# Patient Record
Sex: FEMALE
[De-identification: names, ages, dates, MRNs, and addresses within clinical notes are randomized per-mention and may not be internally consistent; named-entity substitution may affect disease eponyms.]

---

## 2018-03-27 ENCOUNTER — HOSPITAL ENCOUNTER (INPATIENT)
Dept: HOSPITAL 88 - ER | Age: 78
LOS: 5 days | Discharge: HOME | DRG: 690 | End: 2018-04-01
Attending: INTERNAL MEDICINE | Admitting: INTERNAL MEDICINE
Payer: MEDICARE

## 2018-03-27 VITALS — WEIGHT: 265.12 LBS | BODY MASS INDEX: 41.61 KG/M2 | HEIGHT: 67 IN

## 2018-03-27 DIAGNOSIS — N39.0: Primary | ICD-10-CM

## 2018-03-27 DIAGNOSIS — B96.1: ICD-10-CM

## 2018-03-27 DIAGNOSIS — I10: ICD-10-CM

## 2018-03-27 DIAGNOSIS — E66.01: ICD-10-CM

## 2018-03-27 DIAGNOSIS — Z16.12: ICD-10-CM

## 2018-03-27 LAB
ALBUMIN SERPL-MCNC: 4 G/DL (ref 3.5–5)
ALBUMIN/GLOB SERPL: 1.3 {RATIO} (ref 0.8–2)
ALP SERPL-CCNC: 93 IU/L (ref 40–150)
ALT SERPL-CCNC: 24 IU/L (ref 0–55)
ANION GAP SERPL CALC-SCNC: 13.8 MMOL/L (ref 8–16)
BACTERIA URNS QL MICRO: (no result) /HPF
BASOPHILS # BLD AUTO: 0 10*3/UL (ref 0–0.1)
BASOPHILS NFR BLD AUTO: 0 % (ref 0–1)
BILIRUB UR QL: NEGATIVE
BUN SERPL-MCNC: 18 MG/DL (ref 7–26)
BUN/CREAT SERPL: 19 (ref 6–25)
CALCIUM SERPL-MCNC: 10 MG/DL (ref 8.4–10.2)
CHLORIDE SERPL-SCNC: 104 MMOL/L (ref 98–107)
CLARITY UR: CLEAR
CO2 SERPL-SCNC: 25 MMOL/L (ref 22–29)
COLOR UR: YELLOW
DEPRECATED NEUTROPHILS # BLD AUTO: 3.7 10*3/UL (ref 2.1–6.9)
DEPRECATED RBC URNS MANUAL-ACNC: (no result) /HPF (ref 0–5)
EGFRCR SERPLBLD CKD-EPI 2021: 57 ML/MIN (ref 60–?)
EOSINOPHIL # BLD AUTO: 0 10*3/UL (ref 0–0.4)
EOSINOPHIL NFR BLD AUTO: 0 % (ref 0–6)
EPI CELLS URNS QL MICRO: (no result) /LPF
ERYTHROCYTE [DISTWIDTH] IN CORD BLOOD: 15.2 % (ref 11.7–14.4)
GLOBULIN PLAS-MCNC: 3 G/DL (ref 2.3–3.5)
GLUCOSE SERPLBLD-MCNC: 101 MG/DL (ref 74–118)
HCT VFR BLD AUTO: 33 % (ref 34.2–44.1)
HGB BLD-MCNC: 11.3 G/DL (ref 12–16)
KETONES UR QL STRIP.AUTO: NEGATIVE
LEUKOCYTE ESTERASE UR QL STRIP.AUTO: (no result)
LYMPHOCYTES # BLD: 1.5 10*3/UL (ref 1–3.2)
LYMPHOCYTES NFR BLD AUTO: 26 % (ref 18–39.1)
MCH RBC QN AUTO: 29.5 PG (ref 28–32)
MCHC RBC AUTO-ENTMCNC: 34.2 G/DL (ref 31–35)
MCV RBC AUTO: 86.2 FL (ref 81–99)
MONOCYTES # BLD AUTO: 0.4 10*3/UL (ref 0.2–0.8)
MONOCYTES NFR BLD AUTO: 7.4 % (ref 4.4–11.3)
NEUTS SEG NFR BLD AUTO: 66.2 % (ref 38.7–80)
NITRITE UR QL STRIP.AUTO: NEGATIVE
PLATELET # BLD AUTO: 159 X10E3/UL (ref 140–360)
POTASSIUM SERPL-SCNC: 3.8 MMOL/L (ref 3.5–5.1)
PROT UR QL STRIP.AUTO: NEGATIVE
RBC # BLD AUTO: 3.83 X10E6/UL (ref 3.6–5.1)
SODIUM SERPL-SCNC: 139 MMOL/L (ref 136–145)
SP GR UR STRIP: 1 (ref 1.01–1.02)
UROBILINOGEN UR STRIP-MCNC: 0.2 MG/DL (ref 0.2–1)
WBC #/AREA URNS HPF: (no result) /HPF (ref 0–5)

## 2018-03-27 PROCEDURE — 87086 URINE CULTURE/COLONY COUNT: CPT

## 2018-03-27 PROCEDURE — 85025 COMPLETE CBC W/AUTO DIFF WBC: CPT

## 2018-03-27 PROCEDURE — 83036 HEMOGLOBIN GLYCOSYLATED A1C: CPT

## 2018-03-27 PROCEDURE — 87071 CULTURE AEROBIC QUANT OTHER: CPT

## 2018-03-27 PROCEDURE — 80053 COMPREHEN METABOLIC PANEL: CPT

## 2018-03-27 PROCEDURE — 36415 COLL VENOUS BLD VENIPUNCTURE: CPT

## 2018-03-27 PROCEDURE — 96365 THER/PROPH/DIAG IV INF INIT: CPT

## 2018-03-27 PROCEDURE — 81001 URINALYSIS AUTO W/SCOPE: CPT

## 2018-03-27 PROCEDURE — 82948 REAGENT STRIP/BLOOD GLUCOSE: CPT

## 2018-03-27 PROCEDURE — 74177 CT ABD & PELVIS W/CONTRAST: CPT

## 2018-03-27 PROCEDURE — 96374 THER/PROPH/DIAG INJ IV PUSH: CPT

## 2018-03-27 PROCEDURE — 87205 SMEAR GRAM STAIN: CPT

## 2018-03-27 PROCEDURE — 99284 EMERGENCY DEPT VISIT MOD MDM: CPT

## 2018-03-27 PROCEDURE — 80048 BASIC METABOLIC PNL TOTAL CA: CPT

## 2018-03-27 PROCEDURE — 82607 VITAMIN B-12: CPT

## 2018-03-27 PROCEDURE — 87040 BLOOD CULTURE FOR BACTERIA: CPT

## 2018-03-27 PROCEDURE — 84443 ASSAY THYROID STIM HORMONE: CPT

## 2018-03-27 RX ADMIN — HYDRALAZINE HYDROCHLORIDE PRN MG: 20 INJECTION INTRAMUSCULAR; INTRAVENOUS at 23:13

## 2018-03-27 RX ADMIN — Medication PRN ML: at 23:11

## 2018-03-28 VITALS — SYSTOLIC BLOOD PRESSURE: 148 MMHG | DIASTOLIC BLOOD PRESSURE: 69 MMHG

## 2018-03-28 VITALS — SYSTOLIC BLOOD PRESSURE: 127 MMHG | DIASTOLIC BLOOD PRESSURE: 59 MMHG

## 2018-03-28 VITALS — DIASTOLIC BLOOD PRESSURE: 59 MMHG | SYSTOLIC BLOOD PRESSURE: 127 MMHG

## 2018-03-28 VITALS — DIASTOLIC BLOOD PRESSURE: 69 MMHG | SYSTOLIC BLOOD PRESSURE: 148 MMHG

## 2018-03-28 LAB
ALBUMIN SERPL-MCNC: 3.7 G/DL (ref 3.5–5)
ALBUMIN/GLOB SERPL: 1.3 {RATIO} (ref 0.8–2)
ALP SERPL-CCNC: 81 IU/L (ref 40–150)
ALT SERPL-CCNC: 21 IU/L (ref 0–55)
ANION GAP SERPL CALC-SCNC: 11.5 MMOL/L (ref 8–16)
BASOPHILS # BLD AUTO: 0 10*3/UL (ref 0–0.1)
BASOPHILS NFR BLD AUTO: 0 % (ref 0–1)
BUN SERPL-MCNC: 16 MG/DL (ref 7–26)
BUN/CREAT SERPL: 21 (ref 6–25)
CALCIUM SERPL-MCNC: 9.9 MG/DL (ref 8.4–10.2)
CHLORIDE SERPL-SCNC: 105 MMOL/L (ref 98–107)
CO2 SERPL-SCNC: 27 MMOL/L (ref 22–29)
DEPRECATED NEUTROPHILS # BLD AUTO: 2.7 10*3/UL (ref 2.1–6.9)
EGFRCR SERPLBLD CKD-EPI 2021: > 60 ML/MIN (ref 60–?)
EOSINOPHIL # BLD AUTO: 0 10*3/UL (ref 0–0.4)
EOSINOPHIL NFR BLD AUTO: 0 % (ref 0–6)
ERYTHROCYTE [DISTWIDTH] IN CORD BLOOD: 15.5 % (ref 11.7–14.4)
GLOBULIN PLAS-MCNC: 2.8 G/DL (ref 2.3–3.5)
GLUCOSE SERPLBLD-MCNC: 93 MG/DL (ref 74–118)
HCT VFR BLD AUTO: 32.6 % (ref 34.2–44.1)
HGB BLD-MCNC: 10.8 G/DL (ref 12–16)
LYMPHOCYTES # BLD: 1.9 10*3/UL (ref 1–3.2)
LYMPHOCYTES NFR BLD AUTO: 37.1 % (ref 18–39.1)
MCH RBC QN AUTO: 29.2 PG (ref 28–32)
MCHC RBC AUTO-ENTMCNC: 33.1 G/DL (ref 31–35)
MCV RBC AUTO: 88.1 FL (ref 81–99)
MONOCYTES # BLD AUTO: 0.5 10*3/UL (ref 0.2–0.8)
MONOCYTES NFR BLD AUTO: 9.5 % (ref 4.4–11.3)
NEUTS SEG NFR BLD AUTO: 53.2 % (ref 38.7–80)
PLATELET # BLD AUTO: 148 X10E3/UL (ref 140–360)
POTASSIUM SERPL-SCNC: 3.5 MMOL/L (ref 3.5–5.1)
RBC # BLD AUTO: 3.7 X10E6/UL (ref 3.6–5.1)
SODIUM SERPL-SCNC: 140 MMOL/L (ref 136–145)

## 2018-03-28 RX ADMIN — MEROPENEM SCH MLS/HR: 500 INJECTION INTRAVENOUS at 06:58

## 2018-03-28 RX ADMIN — HYDRALAZINE HYDROCHLORIDE PRN MG: 20 INJECTION INTRAMUSCULAR; INTRAVENOUS at 16:10

## 2018-03-28 RX ADMIN — Medication PRN ML: at 16:10

## 2018-03-28 RX ADMIN — MEROPENEM SCH MLS/HR: 500 INJECTION INTRAVENOUS at 15:45

## 2018-03-28 NOTE — DIAGNOSTIC IMAGING REPORT
EXAM: CT ABDOMEN/PELVIS W

DATE: 3/28/2018 8:47 PM  

INDICATION:  \S\COMPLICATED UTI / MDR INFECTION

COMPARISON:  None 

TECHNIQUE: The abdomen and pelvis were scanned using a multidetector helical

scanner. Coronal and sagittal reformations were obtained. Routine protocol

performed.

IV Contrast: 100 ml Isovue 370



FINDINGS:

LOWER THORAX: Minimal scattered atelectasis and air trapping. Nonspecific 2 mm

right pulmonary nodules.



LIVER/BILIARY:  No masses.  No ductal dilatation.



GALLBLADDER: Unremarkable

SPLEEN: Unremarkable

PANCREAS: Unremarkable



ADRENALS: No nodules

KIDNEYS: Symmetric perfusion. No enhancing masses. No hydronephrosis.



GI TRACT: No wall thickening or evidence of obstruction. Normal appendix.



VESSELS: Moderate atherosclerotic changes.

PERITONEUM/RETROPERITONEUM: No free air or fluid

LYMPH NODES: No lymphadenopathy



REPRODUCTIVE ORGANS/BLADDER: Incidental calcified fibroid. Bladder appears

mildly thick walled but is decompressed precluding optimal evaluation.



SOFT TISSUES: Rectus muscle diastases. Tiny umbilical fat-containing hernia.

BONES: Multilevel degenerative changes which results in canal stenosis for

example at L4-5.



IMPRESSION:

No acute abnormality.



Signed by: Dr Jaky Chavez MD on 3/28/2018 10:51 PM

## 2018-03-29 VITALS — SYSTOLIC BLOOD PRESSURE: 94 MMHG | DIASTOLIC BLOOD PRESSURE: 55 MMHG

## 2018-03-29 VITALS — DIASTOLIC BLOOD PRESSURE: 51 MMHG | SYSTOLIC BLOOD PRESSURE: 97 MMHG

## 2018-03-29 VITALS — DIASTOLIC BLOOD PRESSURE: 74 MMHG | SYSTOLIC BLOOD PRESSURE: 180 MMHG

## 2018-03-29 VITALS — DIASTOLIC BLOOD PRESSURE: 59 MMHG | SYSTOLIC BLOOD PRESSURE: 128 MMHG

## 2018-03-29 VITALS — SYSTOLIC BLOOD PRESSURE: 180 MMHG | DIASTOLIC BLOOD PRESSURE: 74 MMHG

## 2018-03-29 VITALS — DIASTOLIC BLOOD PRESSURE: 60 MMHG | SYSTOLIC BLOOD PRESSURE: 124 MMHG

## 2018-03-29 LAB
ANION GAP SERPL CALC-SCNC: 9.4 MMOL/L (ref 8–16)
BASOPHILS # BLD AUTO: 0 10*3/UL (ref 0–0.1)
BASOPHILS NFR BLD AUTO: 0 % (ref 0–1)
BUN SERPL-MCNC: 16 MG/DL (ref 7–26)
BUN/CREAT SERPL: 21 (ref 6–25)
CALCIUM SERPL-MCNC: 9 MG/DL (ref 8.4–10.2)
CHLORIDE SERPL-SCNC: 105 MMOL/L (ref 98–107)
CO2 SERPL-SCNC: 25 MMOL/L (ref 22–29)
DEPRECATED NEUTROPHILS # BLD AUTO: 2 10*3/UL (ref 2.1–6.9)
EGFRCR SERPLBLD CKD-EPI 2021: > 60 ML/MIN (ref 60–?)
EOSINOPHIL # BLD AUTO: 0 10*3/UL (ref 0–0.4)
EOSINOPHIL NFR BLD AUTO: 0 % (ref 0–6)
ERYTHROCYTE [DISTWIDTH] IN CORD BLOOD: 15.6 % (ref 11.7–14.4)
GLUCOSE SERPLBLD-MCNC: 85 MG/DL (ref 74–118)
HCT VFR BLD AUTO: 30.3 % (ref 34.2–44.1)
HGB BLD-MCNC: 10.3 G/DL (ref 12–16)
LYMPHOCYTES # BLD: 2.1 10*3/UL (ref 1–3.2)
LYMPHOCYTES NFR BLD AUTO: 46.4 % (ref 18–39.1)
MCH RBC QN AUTO: 29.3 PG (ref 28–32)
MCHC RBC AUTO-ENTMCNC: 34 G/DL (ref 31–35)
MCV RBC AUTO: 86.1 FL (ref 81–99)
MONOCYTES # BLD AUTO: 0.4 10*3/UL (ref 0.2–0.8)
MONOCYTES NFR BLD AUTO: 9.7 % (ref 4.4–11.3)
NEUTS SEG NFR BLD AUTO: 43.7 % (ref 38.7–80)
PLATELET # BLD AUTO: 137 X10E3/UL (ref 140–360)
POTASSIUM SERPL-SCNC: 3.4 MMOL/L (ref 3.5–5.1)
RBC # BLD AUTO: 3.52 X10E6/UL (ref 3.6–5.1)
SODIUM SERPL-SCNC: 136 MMOL/L (ref 136–145)

## 2018-03-29 RX ADMIN — Medication SCH MG: at 05:33

## 2018-03-29 RX ADMIN — Medication SCH DROP: at 11:55

## 2018-03-29 RX ADMIN — BRIMONIDINE TARTRATE SCH DROP: 1 SOLUTION/ DROPS OPHTHALMIC at 11:54

## 2018-03-29 RX ADMIN — DORZOLAMIDE HYDROCHLORIDE SCH ML: 20 SOLUTION/ DROPS OPHTHALMIC at 10:30

## 2018-03-29 RX ADMIN — LISINOPRIL SCH MG: 10 TABLET ORAL at 10:36

## 2018-03-29 RX ADMIN — MEROPENEM SCH MG: 500 INJECTION INTRAVENOUS at 08:06

## 2018-03-29 RX ADMIN — MEROPENEM SCH MG: 500 INJECTION INTRAVENOUS at 16:23

## 2018-03-29 RX ADMIN — HYDRALAZINE HYDROCHLORIDE PRN MG: 20 INJECTION INTRAMUSCULAR; INTRAVENOUS at 07:27

## 2018-03-29 RX ADMIN — METOPROLOL SUCCINATE SCH MG: 25 TABLET, EXTENDED RELEASE ORAL at 09:00

## 2018-03-30 VITALS — SYSTOLIC BLOOD PRESSURE: 162 MMHG | DIASTOLIC BLOOD PRESSURE: 75 MMHG

## 2018-03-30 VITALS — SYSTOLIC BLOOD PRESSURE: 150 MMHG | DIASTOLIC BLOOD PRESSURE: 67 MMHG

## 2018-03-30 VITALS — DIASTOLIC BLOOD PRESSURE: 57 MMHG | SYSTOLIC BLOOD PRESSURE: 121 MMHG

## 2018-03-30 VITALS — SYSTOLIC BLOOD PRESSURE: 124 MMHG | DIASTOLIC BLOOD PRESSURE: 56 MMHG

## 2018-03-30 VITALS — DIASTOLIC BLOOD PRESSURE: 66 MMHG | SYSTOLIC BLOOD PRESSURE: 154 MMHG

## 2018-03-30 VITALS — SYSTOLIC BLOOD PRESSURE: 110 MMHG | DIASTOLIC BLOOD PRESSURE: 54 MMHG

## 2018-03-30 RX ADMIN — Medication SCH MG: at 17:45

## 2018-03-30 RX ADMIN — MEROPENEM SCH MG: 500 INJECTION INTRAVENOUS at 08:00

## 2018-03-30 RX ADMIN — ENOXAPARIN SODIUM SCH MG: 40 INJECTION SUBCUTANEOUS at 17:45

## 2018-03-30 RX ADMIN — Medication SCH MG: at 11:35

## 2018-03-30 RX ADMIN — METOPROLOL SUCCINATE SCH MG: 25 TABLET, EXTENDED RELEASE ORAL at 09:39

## 2018-03-30 RX ADMIN — Medication SCH MG: at 17:00

## 2018-03-30 RX ADMIN — Medication SCH DROP: at 10:33

## 2018-03-30 RX ADMIN — MEROPENEM SCH MG: 500 INJECTION INTRAVENOUS at 00:23

## 2018-03-30 RX ADMIN — LISINOPRIL SCH MG: 10 TABLET ORAL at 09:38

## 2018-03-30 RX ADMIN — Medication SCH MG: at 05:30

## 2018-03-30 RX ADMIN — Medication SCH MG: at 11:41

## 2018-03-30 RX ADMIN — BRIMONIDINE TARTRATE SCH DROP: 1 SOLUTION/ DROPS OPHTHALMIC at 10:33

## 2018-03-30 RX ADMIN — MEROPENEM SCH MG: 500 INJECTION INTRAVENOUS at 16:00

## 2018-03-30 RX ADMIN — DORZOLAMIDE HYDROCHLORIDE SCH ML: 20 SOLUTION/ DROPS OPHTHALMIC at 09:00

## 2018-03-30 RX ADMIN — LISINOPRIL SCH MG: 10 TABLET ORAL at 17:45

## 2018-03-31 VITALS — SYSTOLIC BLOOD PRESSURE: 141 MMHG | DIASTOLIC BLOOD PRESSURE: 66 MMHG

## 2018-03-31 VITALS — DIASTOLIC BLOOD PRESSURE: 71 MMHG | SYSTOLIC BLOOD PRESSURE: 171 MMHG

## 2018-03-31 VITALS — DIASTOLIC BLOOD PRESSURE: 68 MMHG | SYSTOLIC BLOOD PRESSURE: 120 MMHG

## 2018-03-31 VITALS — DIASTOLIC BLOOD PRESSURE: 63 MMHG | SYSTOLIC BLOOD PRESSURE: 150 MMHG

## 2018-03-31 VITALS — SYSTOLIC BLOOD PRESSURE: 148 MMHG | DIASTOLIC BLOOD PRESSURE: 65 MMHG

## 2018-03-31 VITALS — DIASTOLIC BLOOD PRESSURE: 58 MMHG | SYSTOLIC BLOOD PRESSURE: 119 MMHG

## 2018-03-31 LAB
ANION GAP SERPL CALC-SCNC: 8.7 MMOL/L (ref 8–16)
BASOPHILS # BLD AUTO: 0 10*3/UL (ref 0–0.1)
BASOPHILS NFR BLD AUTO: 0 % (ref 0–1)
BUN SERPL-MCNC: 17 MG/DL (ref 7–26)
BUN/CREAT SERPL: 24 (ref 6–25)
CALCIUM SERPL-MCNC: 9.2 MG/DL (ref 8.4–10.2)
CHLORIDE SERPL-SCNC: 101 MMOL/L (ref 98–107)
CO2 SERPL-SCNC: 27 MMOL/L (ref 22–29)
DEPRECATED NEUTROPHILS # BLD AUTO: 1.3 10*3/UL (ref 2.1–6.9)
EGFRCR SERPLBLD CKD-EPI 2021: > 60 ML/MIN (ref 60–?)
EOSINOPHIL # BLD AUTO: 0 10*3/UL (ref 0–0.4)
EOSINOPHIL NFR BLD AUTO: 0 % (ref 0–6)
ERYTHROCYTE [DISTWIDTH] IN CORD BLOOD: 15.1 % (ref 11.7–14.4)
GLUCOSE SERPLBLD-MCNC: 92 MG/DL (ref 74–118)
HCT VFR BLD AUTO: 30.8 % (ref 34.2–44.1)
HGB BLD-MCNC: 10.3 G/DL (ref 12–16)
LYMPHOCYTES # BLD: 1.3 10*3/UL (ref 1–3.2)
LYMPHOCYTES NFR BLD AUTO: 42.9 % (ref 18–39.1)
MCH RBC QN AUTO: 28.9 PG (ref 28–32)
MCHC RBC AUTO-ENTMCNC: 33.4 G/DL (ref 31–35)
MCV RBC AUTO: 86.3 FL (ref 81–99)
MONOCYTES # BLD AUTO: 0.4 10*3/UL (ref 0.2–0.8)
MONOCYTES NFR BLD AUTO: 14.5 % (ref 4.4–11.3)
NEUTS SEG NFR BLD AUTO: 42.3 % (ref 38.7–80)
PLATELET # BLD AUTO: 133 X10E3/UL (ref 140–360)
POTASSIUM SERPL-SCNC: 3.7 MMOL/L (ref 3.5–5.1)
RBC # BLD AUTO: 3.57 X10E6/UL (ref 3.6–5.1)
SODIUM SERPL-SCNC: 133 MMOL/L (ref 136–145)

## 2018-03-31 RX ADMIN — MEROPENEM SCH MG: 500 INJECTION INTRAVENOUS at 08:22

## 2018-03-31 RX ADMIN — Medication SCH DROP: at 09:00

## 2018-03-31 RX ADMIN — Medication SCH MG: at 08:22

## 2018-03-31 RX ADMIN — LISINOPRIL SCH MG: 10 TABLET ORAL at 08:23

## 2018-03-31 RX ADMIN — Medication SCH MG: at 08:23

## 2018-03-31 RX ADMIN — LISINOPRIL SCH MG: 10 TABLET ORAL at 16:56

## 2018-03-31 RX ADMIN — BRIMONIDINE TARTRATE SCH DROP: 1 SOLUTION/ DROPS OPHTHALMIC at 09:23

## 2018-03-31 RX ADMIN — MEROPENEM SCH MG: 500 INJECTION INTRAVENOUS at 16:14

## 2018-03-31 RX ADMIN — Medication SCH MG: at 16:56

## 2018-03-31 RX ADMIN — ENOXAPARIN SODIUM SCH MG: 40 INJECTION SUBCUTANEOUS at 16:56

## 2018-03-31 RX ADMIN — MEROPENEM SCH MG: 500 INJECTION INTRAVENOUS at 00:00

## 2018-03-31 RX ADMIN — MEROPENEM SCH MG: 500 INJECTION INTRAVENOUS at 23:55

## 2018-03-31 RX ADMIN — DORZOLAMIDE HYDROCHLORIDE SCH ML: 20 SOLUTION/ DROPS OPHTHALMIC at 09:00

## 2018-03-31 RX ADMIN — METOPROLOL SUCCINATE SCH MG: 25 TABLET, EXTENDED RELEASE ORAL at 08:23

## 2018-04-01 VITALS — SYSTOLIC BLOOD PRESSURE: 171 MMHG | DIASTOLIC BLOOD PRESSURE: 76 MMHG

## 2018-04-01 VITALS — SYSTOLIC BLOOD PRESSURE: 120 MMHG | DIASTOLIC BLOOD PRESSURE: 58 MMHG

## 2018-04-01 VITALS — SYSTOLIC BLOOD PRESSURE: 128 MMHG | DIASTOLIC BLOOD PRESSURE: 58 MMHG

## 2018-04-01 RX ADMIN — Medication SCH MG: at 06:00

## 2018-04-01 RX ADMIN — LISINOPRIL SCH MG: 10 TABLET ORAL at 08:00

## 2018-04-01 RX ADMIN — Medication SCH MG: at 08:00

## 2018-04-01 RX ADMIN — DORZOLAMIDE HYDROCHLORIDE SCH ML: 20 SOLUTION/ DROPS OPHTHALMIC at 09:00

## 2018-04-01 RX ADMIN — MEROPENEM SCH MG: 500 INJECTION INTRAVENOUS at 08:00

## 2018-04-01 RX ADMIN — METOPROLOL SUCCINATE SCH MG: 25 TABLET, EXTENDED RELEASE ORAL at 08:00

## 2018-04-01 NOTE — DISCHARGE SUMMARY
FINAL DIAGNOSES:  

1. Klebsiella pneumonia, extended-spectrum beta lactamase multidrug 

resistant urinary tract infection, colony unit is 100,000.  

2. Morbid obesity.

3. Multiple chronic baseline problems. 



SUMMARY:  The patient is a 78-year-old female with urinary tract infection. 

 The urine culture 

outpatient with Dr. Brian Farah was sensitive to Amikacin, ertapenem, 

imipenem and  meropenem only.  Patient has received meropenem here.  She is 

stable.  She will get 1 dose of Invanz 1 gram IM before discharge today.  

The patient is otherwise stable.  She will follow up as an outpatient with 

Dr. Brian Farah.  The patient is stable, discharged home today, resume 

home medication.  The patient has no fever.  No urinary symptoms.  She is 

comfortable.





 





DD:  04/01/2018 11:07

DT:  04/01/2018 17:11

Job#:  [id number]